# Patient Record
Sex: MALE | Race: WHITE | NOT HISPANIC OR LATINO | Employment: FULL TIME | URBAN - METROPOLITAN AREA
[De-identification: names, ages, dates, MRNs, and addresses within clinical notes are randomized per-mention and may not be internally consistent; named-entity substitution may affect disease eponyms.]

---

## 2024-02-14 ENCOUNTER — HOSPITAL ENCOUNTER (EMERGENCY)
Facility: CLINIC | Age: 44
Discharge: HOME | End: 2024-02-14
Attending: EMERGENCY MEDICINE | Admitting: EMERGENCY MEDICINE
Payer: COMMERCIAL

## 2024-02-14 VITALS
RESPIRATION RATE: 16 BRPM | HEART RATE: 77 BPM | SYSTOLIC BLOOD PRESSURE: 128 MMHG | OXYGEN SATURATION: 98 % | DIASTOLIC BLOOD PRESSURE: 78 MMHG | TEMPERATURE: 98 F
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
## 2024-02-14 DIAGNOSIS — S01.01XA SCALP LACERATION, INITIAL ENCOUNTER: ICD-10-CM

## 2024-02-14 PROCEDURE — 12002 RPR S/N/AX/GEN/TRNK2.6-7.5CM: CPT

## 2024-02-14 PROCEDURE — 250N000009 HC RX 250: Performed by: EMERGENCY MEDICINE

## 2024-02-14 PROCEDURE — 99283 EMERGENCY DEPT VISIT LOW MDM: CPT

## 2024-02-14 RX ADMIN — Medication: at 04:00

## 2024-02-14 ASSESSMENT — ACTIVITIES OF DAILY LIVING (ADL)
ADLS_ACUITY_SCORE: 37
ADLS_ACUITY_SCORE: 37

## 2024-02-14 NOTE — ED TRIAGE NOTES
Pt presents to triage with partner with C/O head laceration following fall. Pt was in kitchen and fell, hitting crown of head on counter. Pt endorses drinking some alcohol tonight, denies pain, laceration bleeding minimally on arrival, controlled with guaze. Pt denies LOC, denies blood thinner use.      Triage Assessment (Adult)       Row Name 02/14/24 0236          Triage Assessment    Airway WDL WDL        Respiratory WDL    Respiratory WDL WDL        Skin Circulation/Temperature WDL    Skin Circulation/Temperature WDL WDL        Cardiac WDL    Cardiac WDL WDL        Peripheral/Neurovascular WDL    Peripheral Neurovascular WDL WDL        Cognitive/Neuro/Behavioral WDL    Cognitive/Neuro/Behavioral WDL WDL

## 2024-02-14 NOTE — ED PROVIDER NOTES
History     Chief Complaint:  Laceration and Fall       HPI   Faraz Llanes is a 43 year old male who presents for evaluation of an occipital laceration after a mechanical fall. No subsequent loss of consciousness. Mr. Llanes lost his balance from spinning, hitting his head on the kitchen counter. He has discomfort particularly when inspecting the wound. He has no complaint of pain elsewhere. The patient is not on blood thinners or other medications.    Independent Historian:   Partner provides history noted above.    Review of External Notes:   None      Medications:    Flexeril  Lopid  Dilaudid  Ranitidine    Past Medical History:    Kidney stone  Pancreatitis    Past Surgical History:    Kidney stone removal    Physical Exam   Patient Vitals for the past 24 hrs:   BP Temp Temp src Pulse Resp SpO2   02/14/24 0237 128/78 98  F (36.7  C) Temporal 77 16 98 %        Physical Exam    General: Laying on the ED bed  HEENT: Normocephalic, circular laceration/skin avulsion over the occipital scalp and smaller curved laceration over the inferior occipital parietal scalp on the left  Cardiac: Warm and well perfused, regular rate and rhythm  Pulm: Breathing comfortably, no accessory muscle usage, no conversational dyspnea, and lungs clear bilaterally  GI: Abdomen soft, nontender, no rigidity or guarding  MSK: No bony deformities  Skin: Warm and dry  Neuro: Moves all extremities  Psych: Pleasant mood and affect      Emergency Department Course     Procedures       Laceration Repair      Procedure: Laceration Repair    Indication: Laceration    Consent: Verbal    Location: Occipital scalp    Length: 4 cm    Preparation: Irrigation with Sterile Saline.    Anesthesia/Sedation: 1% lidocaine      Treatment/Exploration: Wound explored, no foreign bodies found     Closure: The wound was closed with one layer. Skin/superficial layer was closed with 5 x 5-0 Fast gut absorbable  using Interrupted sutures. Steri-strips and glue  used as well on the smaller laceration over the inferior left parieto-occipital scalp.    Patient Status: The patient tolerated the procedure well: Yes. There were no complications.      Emergency Department Course & Assessments:    Interventions:  Medications   lido-EPINEPHrine-tetracaine (LET) topical gel GEL ( Topical $Given by Other Clinician 24 0400)        Independent Interpretation (X-rays, CTs, rhythm strip):  None    Assessments/Consultations/Discussion of Management or Tests:  ED Course as of 24 0651      035 I saw this patient for an initial evaluation.   424 I rechecked and updated the patient.   0542 I repaired the patient's laceration.       Social Determinants of Health affecting care:   None    Disposition:  The patient was discharged to home.     Impression & Plan      Medical Decision Makin-year-old male presents after a fall with scalp laceration.  The patient had been drinking wine prior to the fall, and I suspect that alcohol is at least partially contributory to him losing his balance.  The patient's partner states that he glanced off of the counter causing the scalp laceration without a direct forceful impact and the patient was able to catch himself before falling to the floor, so no impact there.  No other apparent injuries on evaluation.  The patient's skin avulsion on the scalp is likely devascularized and will die.  There seems to be dermis underlying the avulsed skin.  The avulsed tissue was sutured back in place as a physical barrier to the wound below and also for better pain control.  The small laceration over the left inferior parietal occipital scalp was more superficial and amenable to repair with Steri-Strips and glue.  Patient denied headache and with the minor mechanism, after discussing CT of the head we decided to defer for now.  Plan is for discharge home with PCP follow-up for further care, RTED for headache, vomiting, other  concerns.      Diagnosis:    ICD-10-CM    1. Fall, initial encounter  W19.XXXA       2. Scalp laceration, initial encounter  S01.01XA                   Scribe Disclosure:  I, Keon Connelly, am serving as a scribe at 3:49 AM on 2/14/2024 to document services personally performed by Chance Ward MD based on my observations and the provider's statements to me.        Chance Ward MD  02/14/24 0651